# Patient Record
Sex: MALE | Race: WHITE
[De-identification: names, ages, dates, MRNs, and addresses within clinical notes are randomized per-mention and may not be internally consistent; named-entity substitution may affect disease eponyms.]

---

## 2017-07-05 ENCOUNTER — HOSPITAL ENCOUNTER (EMERGENCY)
Dept: HOSPITAL 45 - C.EDB | Age: 47
Discharge: HOME | End: 2017-07-05
Payer: COMMERCIAL

## 2017-07-05 VITALS
WEIGHT: 207.23 LBS | HEIGHT: 70 IN | BODY MASS INDEX: 29.67 KG/M2 | WEIGHT: 207.23 LBS | BODY MASS INDEX: 29.67 KG/M2 | HEIGHT: 70 IN

## 2017-07-05 VITALS — SYSTOLIC BLOOD PRESSURE: 113 MMHG | DIASTOLIC BLOOD PRESSURE: 65 MMHG | OXYGEN SATURATION: 95 % | HEART RATE: 72 BPM

## 2017-07-05 VITALS — TEMPERATURE: 98.24 F

## 2017-07-05 VITALS — OXYGEN SATURATION: 95 %

## 2017-07-05 DIAGNOSIS — Z79.899: ICD-10-CM

## 2017-07-05 DIAGNOSIS — Z79.82: ICD-10-CM

## 2017-07-05 DIAGNOSIS — Z82.49: ICD-10-CM

## 2017-07-05 DIAGNOSIS — I25.2: ICD-10-CM

## 2017-07-05 DIAGNOSIS — E11.9: ICD-10-CM

## 2017-07-05 DIAGNOSIS — K21.9: Primary | ICD-10-CM

## 2017-07-05 DIAGNOSIS — I10: ICD-10-CM

## 2017-07-05 LAB
ACANTHOCYTES BLD QL SMEAR: (no result)
ALP SERPL-CCNC: 73 U/L (ref 45–117)
ALT SERPL-CCNC: 57 U/L (ref 12–78)
ANION GAP SERPL CALC-SCNC: 10 MMOL/L (ref 3–11)
AST SERPL-CCNC: 36 U/L (ref 15–37)
BASOPHILS # BLD: 0.56 K/UL (ref 0–0.2)
BASOPHILS NFR BLD: 3.5 % (ref 0–2)
BUN SERPL-MCNC: 18 MG/DL (ref 7–18)
BUN/CREAT SERPL: 13.5 (ref 10–20)
CALCIUM SERPL-MCNC: 9.9 MG/DL (ref 8.5–10.1)
CHLORIDE SERPL-SCNC: 106 MMOL/L (ref 98–107)
CKMB/CK RATIO: 1.1 (ref 0–3)
CO2 SERPL-SCNC: 23 MMOL/L (ref 21–32)
COMPLETE: YES
CREAT CL PREDICTED SERPL C-G-VRATE: 79.1 ML/MIN
CREAT SERPL-MCNC: 1.3 MG/DL (ref 0.6–1.4)
EOSINOPHIL NFR BLD AUTO: 555 K/UL (ref 130–400)
GLUCOSE SERPL-MCNC: 173 MG/DL (ref 70–99)
HCT VFR BLD CALC: 46.3 % (ref 42–52)
HOWELL-JOLLY BOD BLD QL SMEAR: (no result)
LARGE GRANULAR LYMPH ABSOLUTE: 3.63 K/UL
LARGE GRANULAR LYMPHOCYTE %: 22.8 %
LYMPHOCYTES # BLD: 2.93 K/UL (ref 1.2–3.4)
LYMPHOCYTES NFR BLD: 18.4 %
MCH RBC QN AUTO: 30.4 PG (ref 25–34)
MCHC RBC AUTO-ENTMCNC: 33.5 G/DL (ref 32–36)
MCV RBC AUTO: 90.8 FL (ref 80–100)
NEUTROPHILS NFR BLD AUTO: 51.8 %
PMV BLD AUTO: 10.1 FL (ref 7.4–10.4)
POTASSIUM SERPL-SCNC: 4.1 MMOL/L (ref 3.5–5.1)
RBC # BLD AUTO: 5.1 M/UL (ref 4.7–6.1)
SODIUM SERPL-SCNC: 139 MMOL/L (ref 136–145)
WBC # BLD AUTO: 15.9 K/UL (ref 4.8–10.8)

## 2017-07-05 NOTE — EMERGENCY ROOM VISIT NOTE
History


Report prepared by David:  Jabier Angel


Under the Supervision of:  Dr. Freya Figueroa M.D.


First contact with patient:  09:23


Chief Complaint:  ILLNESS


Stated Complaint:  CHEST PAIN LAST EVENING, SOB





History of Present Illness


The patient is a 47 year old male who presents to the Emergency Room with 

complaints of intermittent "burning" centralized chest pain beginning last 

night. He also complains of a cough, chills, and SOB. He states that his 

coughing worsened his chest pain significantly. The patient notes that he is 

currently on Clindamycin for strep throat. He states that he feels symptoms of 

GERD every time he takes his Clindamycin. He is on Prilosec for his GERD. The 

patient had a stress test last year, but is scheduled for another stress test 

in a few months. He denies any increased pain with exertion. He denies any pain 

radiation, or leg swelling. The patient has a history of a previous MI 

occurring over 10 years ago. He took aspirin and nitroglycerin for his symptoms

, but is unsure if it has helped. He has no history of blood clots.





   Source of History:  patient


   Onset:  Yesterday


   Position:  chest (centralized)


   Quality:  burning


   Timing:  intermittent


   Associated Symptoms:  + chills, + cough, + SOB


Note:


The patient denies any pain radiation or leg swelling.





Review of Systems


See HPI for pertinent positives & negatives. A total of 10 systems reviewed and 

were otherwise negative.





Past Medical & Surgical


Medical Problems:


(1) Diabetes


(2) GERD (gastroesophageal reflux disease)


(3) Heart disease


(4) HTN (hypertension)


(5) Myocardial infarct








Family History





Hypertension





Social History


Smoking Status:  Never Smoker


Alcohol Use:  none


Drug Use:  none


Marital Status:  


Housing Status:  lives with family


Occupation Status:  employed





Current/Historical Medications


Scheduled


Aspirin (Aspir-Low), 81 MG PO QAM


Cefdinir (Omnicef), 300 MG PO Q12H


Clindamycin Hcl (Cleocin), 1 CAP PO TID


Fenofibrate (Tricor), 145 MG PO QAM


Levothyroxine Sodium (Synthroid), 125 MCG PO QAM


Loratadine (Claritin), 10 MG PO DAILY


Metformin HCl (Metformin HCl), 1 TAB PO BID


Metoprolol Tartrate (Lopressor) (Lopressor), 0.05 TAB PO BID


Omeprazole (Prilosec), 20 MG PO QAM


Omeprazole (Prilosec), 20 MG PO BID


Rosuvastatin Calcium (Crestor), 40 MG PO QPM





Scheduled PRN


Ranitidine Hcl (Zantac), 150 MG PO BID PRN for GERD





Allergies


Coded Allergies:  


     Amoxicillin (Verified  Allergy, Unknown, RASH, 7/5/17)





Physical Exam


Vital Signs











  Date Time  Temp Pulse Resp B/P (MAP) Pulse Ox O2 Delivery O2 Flow Rate FiO2


 


7/5/17 12:30  72 18 113/65 95   


 


7/5/17 11:30  69 18 122/67 96 Room Air  


 


7/5/17 09:37  76 16 120/72 94 Room Air  


 


7/5/17 09:36     95 Room Air  


 


7/5/17 09:29  67      


 


7/5/17 09:12 36.8 73 18 105/62 96 Room Air  











Physical Exam


Vital signs reviewed.





General: Well-appearing male, in no significant distress.





HEENT: No scleral icterus, PERRLA, neck supple.  Atraumatic.





Cardiovascular: Regular rate and rhythm, no extra sounds.





Pulmonary: Clear to auscultation bilaterally, normal work of breathing.





Abdomen: Soft, nontender, nondistended, positive bowel sounds.





Musculoskeletal: Atraumatic, no peripheral edema.





Neurologic: Patient awake alert and oriented x 3, full strength in all 4 

extremities.  Cranial nerves 2 through 12 grossly intact.





Skin: Warm, dry, no rash





Medical Decision & Procedures


ER Provider


Diagnostic Interpretation:


X-ray results as stated below per interpretation by me and the radiologist: 





CHEST ONE VIEW PORTABLE





FINDINGS: The cardiac and mediastinal contours are normal. There is no evidence


of focal pulmonary consolidation. There is no evidence of failure. No pleural


effusions are visualized.[ 





IMPRESSION: No active disease in the chest.





Electronically signed by:  Flaquito Eastman M.D.





Laboratory Results


7/5/17 09:34








Red Blood Count 5.10, Mean Corpuscular Volume 90.8, Mean Corpuscular Hemoglobin 

30.4, Mean Corpuscular Hemoglobin Concent 33.5, Mean Platelet Volume 10.1





7/5/17 09:34

















Test


  7/5/17


09:34 7/5/17


11:40


 


White Blood Count


  15.90 K/uL


(4.8-10.8) 


 


 


Red Blood Count


  5.10 M/uL


(4.7-6.1) 


 


 


Hemoglobin


  15.5 g/dL


(14.0-18.0) 


 


 


Hematocrit 46.3 % (42-52)  


 


Mean Corpuscular Volume


  90.8 fL


() 


 


 


Mean Corpuscular Hemoglobin


  30.4 pg


(25-34) 


 


 


Mean Corpuscular Hemoglobin


Concent 33.5 g/dl


(32-36) 


 


 


Platelet Count


  555 K/uL


(130-400) 


 


 


Mean Platelet Volume


  10.1 fL


(7.4-10.4) 


 


 


RDW Standard Deviation


  46.4 fL


(36.4-46.3) 


 


 


RDW Coefficient of Variation


  13.9 %


(11.5-14.5) 


 


 


Neutrophils % (Manual) 51.8 %  


 


Lymphocytes % (Manual) 18.4 %  


 


Monocytes % (Manual) 3.5 %  


 


Basophils % (Manual) 3.5 % (0-2)  


 


Neutrophils # (Manual)


  8.24 K/uL


(1.4-6.5) 


 


 


Total Absolute Neutrophils


  8.24 K/uL


(1.4-6.5) 


 


 


Lymphocytes # (Manual)


  2.93 K/uL


(1.2-3.4) 


 


 


Total Absolute Lymphocytes


  6.55 K/uL


(1.2-3.4) 


 


 


Monocytes # (Manual)


  0.56 K/uL


(0.11-0.59) 


 


 


Basophils # (Manual)


  0.56 K/uL


(0-0.2) 


 


 


Percent Large Granular


Lymphocytes 22.8 % 


  


 


 


Absolute Large Granular


Lymphocytes 3.63 K/uL 


  


 


 


Balderas-Jolly Bodies 1+  


 


Acanthocytes 1+  


 


Anion Gap


  10.0 mmol/L


(3-11) 


 


 


Est Creatinine Clear Calc


Drug Dose 79.1 ml/min 


  


 


 


Estimated GFR (


American) 74.3 


  


 


 


Estimated GFR (Non-


American 64.1 


  


 


 


BUN/Creatinine Ratio 13.5 (10-20)  


 


Calcium Level


  9.9 mg/dl


(8.5-10.1) 


 


 


Total Bilirubin


  0.2 mg/dl


(0.2-1) 


 


 


Direct Bilirubin


  < 0.1 mg/dl


(0-0.2) 


 


 


Aspartate Amino Transf


(AST/SGOT) 36 U/L (15-37) 


  


 


 


Alanine Aminotransferase


(ALT/SGPT) 57 U/L (12-78) 


  


 


 


Alkaline Phosphatase


  73 U/L


() 


 


 


Total Creatine Kinase


  98 U/L


() 


 


 


Creatine Kinase MB


  1.1 ng/ml


(0.5-3.6) 


 


 


Creatine Kinase MB Ratio 1.1 (0-3.0)  


 


Total Protein


  7.6 gm/dl


(6.4-8.2) 


 


 


Albumin


  3.7 gm/dl


(3.4-5.0) 


 


 


Bedside Troponin I


  


  < 0.030 ng/ml


(0-0.045)





Laboratory results per my review.





Medications Administered











 Medications


  (Trade)  Dose


 Ordered  Sig/Tita


 Route  Start Time


 Stop Time Status Last Admin


Dose Admin


 


 Lidocaine HCl


  (Viscous


 Lidocaine 2% Soln)  10 ml  NOW  STAT


 PO  7/5/17 09:39


 7/5/17 09:42 DC 7/5/17 09:48


10 ML


 


 Al Hydroxide/Mg


 Hydroxide


  (Maalox Susp)  30 ml  NOW  STAT


 PO  7/5/17 09:39


 7/5/17 09:42 DC 7/5/17 09:48


30 ML











ECG


Indication:  chest pain


Rate (beats per minute):  67


Rhythm:  normal sinus


Findings:  no acute ischemic change, no ectopy, other (J-point elevation in the 

anterior leads. )


Comparison ECG Date:  December 22, 2014


Change:  no significant change





ED Course


0930: Past medical records reviewed. The patient was evaluated in room A9B. A 

complete history and physical examination was performed. 





0939: Ordered Maalox Susp 30 mL O, Lidocaine HCl 10 mL PO. 





1132: I reassessed the patient. He is feeling better.





1220: Upon reevaluation, the patient appeared to have improvement of his 

symptoms. I discussed findings with him. The patient verbalized agreement of 

the treatment plan. He was discharged home.





Medical Decision


Differential diagnosis:


Acute coronary syndrome, pulmonary embolus, aortic dissection, musculoskeletal 

pain, pneumonia, pleural effusion, pneumothorax, medication intolerance, GERD





This patient was evaluated and appeared to be in no significant distress.  IV 

access was obtained and laboratory work was drawn.  Patient was placed on the 

cardiac monitor and found to be in a normal sinus rhythm.  EKG reveals no 

evidence of acute ischemic change.  There is no change from EKG dated December 2014.  Cardiac enzymes are negative 2.  Patient had some improvement with a GI 

cocktail.  I suspect his symptoms are GERD/gastritis related to the clindamycin 

he is taking.  The patient has a rash reaction to amoxicillin.  He was switched 

to Omnicef 300 mg twice daily for 10 days.  He will stop the clindamycin.  He 

will increase the Prilosec to 20 mg twice a day.  He continues Zantac as needed 

when necessary.  Patient will follow-up with his primary care physician this 

week and return to the ER for worsening of symptoms or any medical concerns.





Impression





 Primary Impression:  


 GERD (gastroesophageal reflux disease)


 Additional Impression:  


 Medication intolerance





Scribe Attestation


The scribe's documentation has been prepared under my direction and personally 

reviewed by me in its entirety. I confirm that the note above accurately 

reflects all work, treatment, procedures, and medical decision making performed 

by me.





Departure Information


Dispostion


Home / Self-Care





Prescriptions





Cefdinir (Omnicef) 300 Mg Cap


300 MG PO Q12H for 10 Days, #20 CAP


   Prov: Freya Figueroa M.D.         7/5/17 


Ranitidine Hcl (ZANTAC) 150 Mg Tab


150 MG PO BID Y for GERD, #30 TAB


   Prov: Freya Figueroa M.D.         7/5/17 


Omeprazole (PRILOSEC) 20 Mg Capcr


20 MG PO BID for 7 Days, #14 CAP


   Prov: Freya Figueroa M.D.         7/5/17





Referrals


Juan Jose Perea M.D. (PCP)





Forms


HOME CARE DOCUMENTATION FORM,                                                 

               IMPORTANT VISIT INFORMATION, WORK / SCHOOL INSTRUCTIONS





Patient Instructions


My [facility]





Additional Instructions





Diagnosis: GERD, medication intolerance





Increase prilosec to 20 mg TWICE daily.





Zantac 150 mg every 12 hours as needed for gastritis.





Drink plenty of fluids.





Avoid coffee, alcohol, aspirin, aleve and ibuprofen 





Return to the ED for worsening of symptoms or any medical concerns.





Problem Qualifiers

## 2017-07-05 NOTE — DIAGNOSTIC IMAGING REPORT
CHEST ONE VIEW PORTABLE



CLINICAL HISTORY: Atypical chest pain    



COMPARISON STUDY:  12/22/2014



FINDINGS: The cardiac and mediastinal contours are normal. There is no evidence

of focal pulmonary consolidation. There is no evidence of failure. No pleural

effusions are visualized.[ 



IMPRESSION: No active disease in the chest.







Electronically signed by:  Flaquito Eastman M.D.

7/5/2017 10:22 AM



Dictated Date/Time:  7/5/2017 10:21 AM

## 2017-11-21 ENCOUNTER — HOSPITAL ENCOUNTER (OUTPATIENT)
Dept: HOSPITAL 45 - C.CTS | Age: 47
Discharge: HOME | End: 2017-11-21
Attending: INTERNAL MEDICINE
Payer: COMMERCIAL

## 2017-11-21 DIAGNOSIS — K76.0: ICD-10-CM

## 2017-11-21 DIAGNOSIS — R91.1: ICD-10-CM

## 2017-11-21 DIAGNOSIS — Q23.1: Primary | ICD-10-CM

## 2017-11-21 NOTE — DIAGNOSTIC IMAGING REPORT
CT CHEST ANGIO WITH CONTRAST



CT DOSE: 403.38 mGy.cm



CLINICAL HISTORY: Bicuspid aortic valve.    



TECHNIQUE: The patient was scanned in a dynamic helical fashion during

intravenous administration of 119 cc Optiray 320. MIP imaging was performed.  A

dose lowering technique was utilized adhering to the principles of ALARA.





COMPARISON STUDY:  12/4/2013



FINDINGS: No thyroid masses are visualized.



There is no evidence of pathologic mediastinal lymphadenopathy.



There is no evidence of pathologic hilar lymphadenopathy.



There is no evidence of pathologic axillary lymphadenopathy.



There are no pulmonary artery filling defects to indicate acute pulmonary

embolism.



There is no evidence of thoracic aortic aneurysm. The ascending thoracic aorta

measures 29 mm. There is no evidence of thoracic aortic dissection. There is no

evidence of coarctation. Although this study was not performed in a gated

fashion, the images of the aortic root suggest a tricuspid valve.



No pleural effusions are visualized. There is no evidence of focal pulmonary

consolidation. There is minor biapical pleural-parenchymal scarring. There is a

3.4 mm solid right upper lobe pulmonary nodule. This remains unchanged the prior

December 2013 study.



There is hepatic steatosis.



IMPRESSION:  

1. No evidence of thoracic aortic aneurysm, dissection, or a large dictation

2. Stable 3 mm right upper lobe pulmonary nodule

3. No evidence of pathologic adenopathy

4. Hepatic steatosis 







Electronically signed by:  Flaquito Eastman M.D.

11/21/2017 9:34 AM



Dictated Date/Time:  11/21/2017 9:24 AM Yes

## 2018-02-06 ENCOUNTER — HOSPITAL ENCOUNTER (EMERGENCY)
Dept: HOSPITAL 45 - C.EDB | Age: 48
Discharge: STILL ADMITTED OR ADMINISTRATIVE | End: 2018-02-06
Payer: COMMERCIAL

## 2018-02-06 VITALS
WEIGHT: 203.71 LBS | BODY MASS INDEX: 29.16 KG/M2 | HEIGHT: 70 IN | WEIGHT: 203.71 LBS | HEIGHT: 70 IN | BODY MASS INDEX: 29.16 KG/M2

## 2018-02-06 VITALS — OXYGEN SATURATION: 98 %

## 2018-02-06 VITALS — HEART RATE: 84 BPM | SYSTOLIC BLOOD PRESSURE: 135 MMHG | OXYGEN SATURATION: 99 % | DIASTOLIC BLOOD PRESSURE: 79 MMHG

## 2018-02-06 VITALS — TEMPERATURE: 98.06 F

## 2018-02-06 DIAGNOSIS — E11.9: ICD-10-CM

## 2018-02-06 DIAGNOSIS — I25.2: ICD-10-CM

## 2018-02-06 DIAGNOSIS — I73.9: ICD-10-CM

## 2018-02-06 DIAGNOSIS — Z82.49: ICD-10-CM

## 2018-02-06 DIAGNOSIS — Z79.82: ICD-10-CM

## 2018-02-06 DIAGNOSIS — Z79.899: ICD-10-CM

## 2018-02-06 DIAGNOSIS — I25.10: ICD-10-CM

## 2018-02-06 DIAGNOSIS — I21.3: Primary | ICD-10-CM

## 2018-02-06 DIAGNOSIS — I44.7: ICD-10-CM

## 2018-02-06 DIAGNOSIS — K21.9: ICD-10-CM

## 2018-02-06 LAB
ALBUMIN SERPL-MCNC: 4.4 GM/DL (ref 3.4–5)
ALP SERPL-CCNC: 69 U/L (ref 45–117)
ALT SERPL-CCNC: 73 U/L (ref 12–78)
AST SERPL-CCNC: 38 U/L (ref 15–37)
BUN SERPL-MCNC: 24 MG/DL (ref 7–18)
BUN SERPL-MCNC: 24 MG/DL (ref 7–18)
CA-I BLD-SCNC: 1.36 MMOL/L (ref 1.12–1.32)
CALCIUM SERPL-MCNC: 7.3 MG/DL (ref 8.5–10.1)
CALCIUM SERPL-MCNC: 9.7 MG/DL (ref 8.5–10.1)
CK MB SERPL-MCNC: 1.3 NG/ML (ref 0.5–3.6)
CK MB SERPL-MCNC: 1.8 NG/ML (ref 0.5–3.6)
CO2 SERPL-SCNC: 18 MMOL/L (ref 21–32)
CO2 SERPL-SCNC: 25 MMOL/L (ref 21–32)
CREAT BLD-MCNC: 1.2 MG/DL (ref 0.6–1.3)
CREAT SERPL-MCNC: 1.32 MG/DL (ref 0.6–1.4)
CREAT SERPL-MCNC: 1.4 MG/DL (ref 0.6–1.4)
EOSINOPHIL NFR BLD AUTO: 313 K/UL (ref 130–400)
EOSINOPHIL NFR BLD AUTO: 561 K/UL (ref 130–400)
GLUCOSE SERPL-MCNC: 164 MG/DL (ref 70–99)
GLUCOSE SERPL-MCNC: 286 MG/DL (ref 70–99)
HCT VFR BLD CALC: 43.7 % (ref 42–52)
HCT VFR BLD CALC: 45.6 % (ref 42–52)
HGB BLD-MCNC: 14.1 G/DL (ref 14–18)
HGB BLD-MCNC: 15.2 G/DL (ref 14–18)
ISTAT POTASSIUM: 4 MEQ/L (ref 3.3–5)
MCH RBC QN AUTO: 30.4 PG (ref 25–34)
MCH RBC QN AUTO: 30.7 PG (ref 25–34)
MCHC RBC AUTO-ENTMCNC: 32.3 G/DL (ref 32–36)
MCHC RBC AUTO-ENTMCNC: 33.3 G/DL (ref 32–36)
MCV RBC AUTO: 92.1 FL (ref 80–100)
MCV RBC AUTO: 94.2 FL (ref 80–100)
PMV BLD AUTO: 10.3 FL (ref 7.4–10.4)
PMV BLD AUTO: 11 FL (ref 7.4–10.4)
POTASSIUM SERPL-SCNC: 3.6 MMOL/L (ref 3.5–5.1)
POTASSIUM SERPL-SCNC: 4 MMOL/L (ref 3.5–5.1)
PROT SERPL-MCNC: 8.1 GM/DL (ref 6.4–8.2)
PTT PATIENT: 23.3 SECONDS (ref 21–31)
RED CELL DISTRIBUTION WIDTH CV: 14.4 % (ref 11.5–14.5)
RED CELL DISTRIBUTION WIDTH CV: 14.6 % (ref 11.5–14.5)
RED CELL DISTRIBUTION WIDTH SD: 47.8 FL (ref 36.4–46.3)
RED CELL DISTRIBUTION WIDTH SD: 49.4 FL (ref 36.4–46.3)
SODIUM BLD-SCNC: 142 MEQ/L (ref 135–144)
SODIUM SERPL-SCNC: 138 MMOL/L (ref 136–145)
SODIUM SERPL-SCNC: 139 MMOL/L (ref 136–145)
WBC # BLD AUTO: 20.98 K/UL (ref 4.8–10.8)
WBC # BLD AUTO: 24.46 K/UL (ref 4.8–10.8)

## 2018-02-06 NOTE — EMERGENCY ROOM VISIT NOTE
ED Visit Note


First contact with patient:  18:02


Patient was seen by our PA/NP.  I was involved in the patient's care and did 

evaluate the patient myself.  I was involved in the care throughout the ER stay.





The patient presents with substernal chest pain.  He had pain a few days ago 

while doing some snow work that resolved with rest.  Today, he had pain while 

exercising.  The severe pain subsided after exercise but he's had some burning 

across his chest for the last several hours.





The patient's workup does show a new left bundle-branch block with some ST 

elevation across the anterior leads.  The bundle-branch block is new as of the 

last half a year.  The patient is still having some mild burning in his chest.  

He does have an impressive systolic murmur on exam-the murmur has been 

documented in the past.





Because of the change in the EKG and his ongoing chest pain.  I did contact the 

cardiologist on for heart alert.  Based on the story, a heart alert was called.

## 2018-02-06 NOTE — EMERGENCY ROOM VISIT NOTE
History


First contact with patient:  18:02


Chief Complaint:  CHEST PAIN


Stated Complaint:  CHEST PAIN, PRESSURE ON HEART- CARDIAC HX


Nursing Triage Summary:  


onset of CP while on the eliptical today around noon. pt reports it was mid 


chest, burning in nature. Associated SOB and dizziness at that time. Pt also 


reports episode of the same on Sunday while using the . Pt reports 


burning, heartburn currently





History of Present Illness


The patient is a 47 year old male who presents to the Emergency Room via 

private vehicle with complaints of "chest pain, pressure and heart-cardiac 

history".  The patient states that he has a history of MI with intervention in 

2004.  He had an echo one month ago which he states was normal, but notes that 

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 

developed chest pain.  It quickly resolved after rest.  He states that earlier 

today around 3 or 3:30 PM he was on the treadmill exercising, and developed 

chest pain that extended from his throat to his umbilicus.  He notes that when 

he exited the treadmill the pain went away.  He now notes a burning sensation 

in his throat and superior chest.  He notes it feels like a burning sensation 

somewhat that of reflux but states that when he had his previous heart attack 

it felt identical.  He had a baby aspirin earlier today.





Review of Systems


A complete 10-point Review of Systems was discussed with the patient, with 

pertinent positives and negatives listed in the History of Present Illness. All 

remaining Review of Systems questions can be considered negative unless 

otherwise specified.





Past Medical/Surgical History


Medical Problems:


(1) Diabetes


(2) GERD (gastroesophageal reflux disease)


(3) Heart disease


(4) HTN (hypertension)


(5) Myocardial infarct








Family History





Hypertension





Social History


Smoking Status:  Never Smoker


Alcohol Use:  none


Drug Use:  none


Marital Status:  


Housing Status:  lives with family


Occupation Status:  employed





Current/Historical Medications


Scheduled


Aspirin (Aspir-Low), 81 MG PO QAM


Fenofibrate (Tricor), 145 MG PO QAM


Levothyroxine Sodium (Synthroid), 125 MCG PO QAM


Loratadine (Claritin), 10 MG PO DAILY


Metformin HCl (Metformin HCl), 500 MG PO BID


Metoprolol Tartrate (Lopressor) (Lopressor), 1 DOSE PO BID


Omeprazole (Prilosec), 20 MG PO QAM


Rosuvastatin Calcium (Crestor), 40 MG PO QPM





Physical Exam


Vital Signs











  Date Time  Temp Pulse Resp B/P (MAP) Pulse Ox O2 Delivery O2 Flow Rate FiO2


 


2/6/18 18:42  84 15 135/79 99 Nasal Cannula 2.0 


 


2/6/18 18:33  84 16 135/79 99 Nasal Cannula 2.0 


 


2/6/18 18:19     98 Room Air  


 


2/6/18 18:19     98 Room Air  


 


2/6/18 18:12  80      


 


2/6/18 17:51 36.7 72 18 115/69 96 Room Air  











Physical Exam


VITAL SIGNS - Vital signs and nursing notes were reviewed.  Stable.


GENERAL - 47-year-old male appearing his stated age who is in no acute 

distress. He is flushed in appearance in relation to his skin hue. Communicates 

well with provider and answers questions appropriately.


SKIN - Without rashes.


HEAD - NC/AT.


EYES - Sclera anicteric. 


EARS - No deformities of external structures noted on gross examination 

bilaterally. 


NOSE - Midline and without cyanosis. No epistaxis or purulent drainage noted.


MOUTH/OROPHARYNX - Without perioral cyanosis. 


NECK - Neck with FROM. Supple to palpation. 


LUNGS - Chest wall symmetric without accessory muscle use, intercostals 

retractions, or central cyanosis. Normal vesicular breath sounds CTA B/L. No 

wheezes, rales, or rhonchi appreciated.


CARDIAC - RRR with S1/S2. Loud murmur appreciated.


ABDOMEN - Abdominal contour normal without pulsations or visible masses. 


EXTREMITIES - No clubbing or peripheral cyanosis.





Medical Decision & Procedures


ER Provider


Diagnostic Interpretation:


CHEST ONE VIEW PORTABLE





HISTORY:  47 years-old Male chest pain acute atypical chest pain





COMPARISON: Chest radiograph 7/5/2017, CTA chest 11/21/2017





TECHNIQUE: Portable AP view of the chest





FINDINGS: 


Cardiomediastinal and hilar silhouettes are within normal limits.


Atherosclerosis of the aorta. No pneumothorax, pleural effusion, focal airspace


consolidation or overt pulmonary edema. Bones of the chest appear grossly


intact. Curvilinear calcification overlying the right greater tuberosity may


reflect calcific bursitis, however is nonspecific. Surgical clips project over


the epigastric region.





IMPRESSION: No acute process. 











The above report was generated using voice recognition software. It may contain


grammatical, syntax or spelling errors.











Electronically signed by:  Fede Phelps M.D.


2/6/2018 6:40 PM





Dictated Date/Time:  2/6/2018 6:37 PM





Laboratory Results


2/6/18 21:00








Red Blood Count 4.64, Mean Corpuscular Volume 94.2, Mean Corpuscular Hemoglobin 

30.4, Mean Corpuscular Hemoglobin Concent 32.3, Mean Platelet Volume 11.0





2/6/18 21:00

















Test


  2/6/18


18:10 2/6/18


18:16 2/6/18


18:21 2/6/18


21:00


 


Activated Partial


Thromboplast Time 23.3 SECONDS


(21.0-31.0) 


  


  


 


 


Partial Thromboplastin Ratio 0.9    


 


Total Bilirubin


  0.3 mg/dl


(0.2-1) 


  


  


 


 


Aspartate Amino Transf


(AST/SGOT) 38 U/L (15-37) 


  


  


  


 


 


Alanine Aminotransferase


(ALT/SGPT) 73 U/L (12-78) 


  


  


  


 


 


Alkaline Phosphatase


  69 U/L


() 


  


  


 


 


Total Creatine Kinase


  155 U/L


() 


  


  


 


 


Total Protein


  8.1 gm/dl


(6.4-8.2) 


  


  


 


 


Albumin


  4.4 gm/dl


(3.4-5.0) 


  


  


 


 


Globulin


  3.7 gm/dl


(2.5-4.0) 


  


  


 


 


Albumin/Globulin Ratio 1.2 (0.9-2)    


 


Thyroid Stimulating Hormone


(TSH) 0.479 uIu/ml


(0.300-4.500) 


  


  


 


 


Bedside Troponin I


  


  < 0.030 ng/ml


(0-0.045) 


  


 


 


Bedside Hemoglobin


  


  


  15.6 g/dl


(14.0-18.0) 


 


 


Bedside Hematocrit   46 % (42-52)  


 


Bedside Sodium


  


  


  142 mEq/L


(135-144) 


 


 


Bedside Potassium


  


  


  4.0 mEq/L


(3.3-5.0) 


 


 


Bedside Chloride


  


  


  103 mEq/L


(101-112) 


 


 


Bedside Total CO2


  


  


  25 mEq/l


(24-31) 


 


 


Bedside Blood Urea Nitrogen


  


  


  27 mg/dl


(7-18) 


 


 


Bedside Creatinine


  


  


  1.2 mg/dl


(0.6-1.3) 


 


 


Bedside Glucose (other)


  


  


  170 mg/dl


(70-99) 


 


 


Bedside Ionized Calcium (John)


  


  


  1.36 mmol/l


(1.12-1.32) 


 


 


White Blood Count


  


  


  


  24.46 K/uL


(4.8-10.8)


 


Red Blood Count


  


  


  


  4.64 M/uL


(4.7-6.1)


 


Hemoglobin


  


  


  


  14.1 g/dL


(14.0-18.0)


 


Hematocrit    43.7 % (42-52) 


 


Mean Corpuscular Volume


  


  


  


  94.2 fL


()


 


Mean Corpuscular Hemoglobin


  


  


  


  30.4 pg


(25-34)


 


Mean Corpuscular Hemoglobin


Concent 


  


  


  32.3 g/dl


(32-36)


 


Platelet Count


  


  


  


  313 K/uL


(130-400)


 


Mean Platelet Volume


  


  


  


  11.0 fL


(7.4-10.4)


 


RDW Standard Deviation


  


  


  


  49.4 fL


(36.4-46.3)


 


RDW Coefficient of Variation


  


  


  


  14.6 %


(11.5-14.5)


 


Neutrophils % (Manual)    40.0 % 


 


Lymphocytes % (Manual)    28.7 % 


 


Variant Lymphocytes % (manual)    25.2 % 


 


Monocytes % (Manual)    5.2 % 


 


Basophils % (Manual)    0.9 % (0-2) 


 


Neutrophils # (Manual)


  


  


  


  9.78 K/uL


(1.4-6.5)


 


Total Absolute Neutrophils


  


  


  


  9.78 K/uL


(1.4-6.5)


 


Lymphocytes # (Manual)


  


  


  


  7.02 K/uL


(1.2-3.4)


 


Absolute Variant Lymphocytes    6.16 K/uL 


 


Total Absolute Lymphocytes


  


  


  


  13.18 K/uL


(1.2-3.4)


 


Monocytes # (Manual)


  


  


  


  1.27 K/uL


(0.11-0.59)


 


Basophils # (Manual)


  


  


  


  0.22 K/uL


(0-0.2)


 


Anion Gap


  


  


  


  13.0 mmol/L


(3-11)


 


Est Creatinine Clear Calc


Drug Dose 


  


  


  72.9 ml/min 


 


 


Estimated GFR (


American) 


  


  


  67.9 


 


 


Estimated GFR (Non-


American 


  


  


  58.6 


 


 


BUN/Creatinine Ratio    16.9 (10-20) 


 


Calcium Level


  


  


  


  7.3 mg/dl


(8.5-10.1)


 


Creatine Kinase MB


  


  


  


  1.3 ng/ml


(0.5-3.6)


 


Chemistry Specimen Hemolysis     


 


Test


  2/6/18


21:49 2/6/18


22:10 


  


 


 


Creatine Kinase MB Ratio  (0-3.0)    


 


Lactic Acid Level


  


  12.2 mmol/L


(0.4-2.0) 


  


 











Medications Administered











 Medications


  (Trade)  Dose


 Ordered  Sig/Tita


 Route  Start Time


 Stop Time Status Last Admin


Dose Admin


 


 Aspirin


  (Aspirin Chew)  243 mg  NOW  STAT


 PO  2/6/18 18:12


 2/6/18 18:14 DC 2/6/18 18:23


243 MG


 


 Nitroglycerin


  (Nitroglycerin


 2% Oint)  1 inch  NOW  STAT


 EXT  2/6/18 18:15


 2/6/18 18:16 DC 2/6/18 18:23


1 INCH


 


 Heparin Sodium


  (Porcine)


  (Heparin Sq 5000


 Unit/0.5ml)  5,000 unit  STK-MED ONCE


 .ROUTE  2/6/18 18:21


 2/6/18 18:22 DC 2/6/18 18:27


5,000 UNIT


 


 Midazolam HCl


  (Versed Inj)  2 mg  STK-MED ONCE


 .ROUTE  2/6/18 18:47


 2/6/18 18:48 DC 2/6/18 18:47


2 MG


 


 Fentanyl Citrate


  (Fentanyl Inj)  100 mcg  STK-MED ONCE


 .ROUTE  2/6/18 18:47


 2/6/18 18:48 DC 2/6/18 18:47


50 MCG


 


 Midazolam HCl


  (Versed Inj)  2 mg  STK-MED ONCE


 .ROUTE  2/6/18 19:36


 2/6/18 19:37 DC 2/6/18 19:36


2 MG


 


 Fentanyl Citrate


  (Fentanyl Inj)  100 mcg  STK-MED ONCE


 .ROUTE  2/6/18 19:36


 2/6/18 19:37 DC 2/6/18 19:36


100 MCG


 


 Midazolam HCl


  (Versed Inj)  2 mg  STK-MED ONCE


 .ROUTE  2/6/18 19:56


 2/6/18 19:57 DC 2/6/18 19:56


2 MG


 


 Fentanyl Citrate


  (Fentanyl Inj)  100 mcg  STK-MED ONCE


 .ROUTE  2/6/18 19:56


 2/6/18 19:57 DC 2/6/18 19:56


100 MCG


 


 Furosemide


  (Lasix Inj)  40 mg  STK-MED ONCE


 .ROUTE  2/6/18 20:15


 2/6/18 20:16 DC 2/6/18 20:15


40 MG


 


 Ondansetron HCl


  (Zofran Inj)  4 mg  STK-MED ONCE


 .ROUTE  2/6/18 20:20


 2/6/18 20:21 DC 2/6/18 20:20


4 MG


 


 Dopamine HCl/


 Dextrose


  (DOPamine 400MG /


 D5W)  400 mg  STK-MED ONCE


 .ROUTE  2/6/18 20:21


 2/6/18 20:22 DC 2/6/18 20:21


400 MG


 


 Norepinephrine


 Bitartrate


  (Levophed Inj)  8 mg  STK-MED ONCE


 IV  2/6/18 20:25


 2/6/18 20:26 DC 2/6/18 20:25


8 MG


 


 Phenylephrine HCl


  (Sami-Synephrine


 Inj)  10 mg  STK-MED ONCE


 .ROUTE  2/6/18 20:36


 2/6/18 20:37 DC 2/6/18 20:36


10 MG


 


 Phenylephrine HCl


  (Sami-Synephrine


 Inj)  10 mg  STK-MED ONCE


 .ROUTE  2/6/18 20:40


 2/6/18 20:41 DC 2/6/18 20:40


10 MG


 


 Epinephrine HCl


  (EpINEphrine INJ


 1MG/ML AMP/VIAL)  4 mg  STK-MED ONCE


 .ROUTE  2/6/18 21:18


 2/6/18 21:19 DC 2/6/18 21:18


4 MG


 


 Sodium Bicarbonate


  (Sodium


 Bicarbonate 8.4%


 Inj)  100 ml  STK-MED ONCE


 IV  2/6/18 21:26


 2/6/18 21:27 DC 2/6/18 21:26


100 ML


 


 Midazolam HCl


  (Versed Inj)  2 mg  STK-MED ONCE


 .ROUTE  2/6/18 21:29


 2/6/18 21:30 DC 2/6/18 21:29


2 MG


 


 Fentanyl Citrate


  (Fentanyl Inj)  100 mcg  STK-MED ONCE


 .ROUTE  2/6/18 21:29


 2/6/18 21:30 DC 2/6/18 21:29


100 MCG


 


 Amiodarone HCL/


 Dextrose


  (Nexterone / D5w)  360 mg  STK-MED ONCE


 .ROUTE  2/6/18 21:41


 2/6/18 21:42 DC 2/6/18 21:41


360 MG


 


 Amiodarone HCL/


 Dextrose


  (Nexterone / D5w)  150 mg  STK-MED ONCE


 .ROUTE  2/6/18 21:41


 2/6/18 21:42 DC 2/6/18 21:41


150 MG


 


 Dobutamine HCl


  (DOBUTamine /


 D5W)  500 mg  STK-MED ONCE


 .ROUTE  2/6/18 21:45


 2/6/18 21:46 DC 2/6/18 21:45


500 MG


 


 Norepinephrine


 Bitartrate


  (Levophed Inj)  8 mg  STK-MED ONCE


 IV  2/6/18 22:34


 2/6/18 22:35 DC 2/6/18 22:34


8 MG











Medical Decision


Patient was seen and evaluated as above.  Upon my entrance into the exam room 

the patient was flushed, and was noting he was feeling a burning sensation in 

his chest but no pain.  Bedside EKG was reviewed and per my interpretation 

reveals a left bundle branch block which is new compared to previous.  This is 

concerning in the setting of active chest pain.  I called the attending 

physician and we discussed the case without delay. He was given aspirin, 

nitroglycerin.  Chest x-ray was obtained.  Heart alert was called.  The 

attending physician, Dr. Camejo also personally evaluate the patient and spoke 

with the cardiac interventionalist. They recommended 5000 units of heparin.  

This was given IV.  The patient will be taken to the Cath Lab for further 

evaluation and management of his active chest pain in the setting of EKG 

changes.  Initial troponin was 0.02.  CBC reveals leukocytosis of 20.98. Coags 

normal. Metabolic panel reveals a dehydrated state with BUN elevated at 24 and 

cr at 1.32.  No evidence of liver failure.  Patient's AST elevated at 38.  TSH 

normal.  The patient will be taken to cath lab for intervention.  Please refer 

to further documentation regarding his stay.





In evaluation treatment this patient following differential diagnoses were 

entertained: MI, PE, dissection, pneumonia, pericarditis, costochondritis, 

among others.





Impression





 Primary Impression:  


 Chest pain


 Additional Impression:  


 New onset left bundle branch block (LBBB)





Departure Information


Referrals


Juan Jose Perea M.D. (PCP)





Forms


Call Back Authorization, HOME CARE DOCUMENTATION FORM,                         

                                       IMPORTANT VISIT INFORMATION





Patient Instructions


[facility]





Problem Qualifiers

## 2018-02-06 NOTE — CARDIAC CATHETERIZATION
Procedure Note


Procedure Date


Feb 6, 2018.





Pre-Procedure Diagnosis


STEMI





AUC Score


9





Post-Procedure Diagnosis


Severe CAD, Unsuccessful PCI





Procedure(s) Performed


Coronary Angiography, PTCA, Temporary Pacemaker, IABP, CPR, Defibrillation





Cardiologist


Wily





Assistant(s)


Ernestina





Estimated Blood Loss


20





Medication(s)


Dopamine, Epinephrine, Fentanyl, Heparin, Nicardipine, Nitroglycerin, 

Norepinephrine, Versed, Lidocaine 1%


Dobutamine


Phenylephedrine





Summary of Findings


Indication: 


47 year old man with known CAD s/p prior MI and PCI with stents x2 to RCA in 

2004, bicuspid aortic valve with moderate AS and AI, non-insulin dependent 

diabetes, PAD who presented with new intermittent exertional chest pain over 

the last several days.  Pain recurred today after with exercise and presented 

with persistent chest burning.  In found to have a new LBBB and Heart alert 

activated.  





Access: 6Fr slender right radial artery


   6Fr right femoral artery


   8Fr left femoral artery


   6Fr right femoral vein





Catheters: Tiger, JR4, 3DRC, Trap, RCB; JL3.5 guide; JR4 guide





Findings:


LM - 50% ostial stenosis, 20% distal stenosis


LAD - Suspected at least moderate ostial LAD stenosis; angiographically normal 

mid and distal vessel; gives off 2 moderate caliber diagonals without 

significant disease.


Circumflex - Moderate caliber vessel with minimal luminal irregularities.  

Gives off 3 OMs.  Distal OM is moderate caliber without significant disease.


RCA - Large, dominant vessel with ostial/proximal RCA stent with 95% ostial in-

stent restenosis; widely patent mid circumflex stent; gives off large PDA and R-

PLB with minimal disease.





Significant difficulty engaging ostial RCA requiring multiple attempted 

catheters.  Eventually able to demonstrate critical ostial in-stent restenosis 

of RCA with RCB catheter.


LM and ostial LAD disease was questionable on initial angiography and plan was 

to further evaluate with iFR.


With guide catheter was able to demonstrate severe ostial LM disease.  In the 

setting of ostial RCA disease, ostial LM disease and moderate AS plan was to 

finish case and transfer to tertiary center for evaluation for CABG, AVR.


Patient than began to develop severe chest pain, diaphoresis and hypoxia.  Left 

system was shown to be unchanged.  


Noted to have new ST elevations in inferior leads and RCA thought likely to be 

occluded.


Patient became increasingly hypotensive and started on dopamine and IVFs


RCA was cannulated with JR4 guide and BMW was eventually passed into the distal 

vessel


Rhythm change to VT requiring 1 defibrillation and amiodarone bolus.  


In the setting of hemodynamic/electrical instability, hypoxia and vomiting 

decision made to intubate patient


In preparation for intubation patient had PEA arrest requiring epi and brief 

CPR with ROSC


Code blue called and patient intubated by Dr. Gutierrez.


Attempted to POBA ostial ISR but with inflation to ostium ruptured multiple 2.5 

compliant balloons. 


Patient became increasingly hypotensive requiring escalating pressors including 

norepinephrine, phenylephedrine/epinephrine boluses.  Acidemia treated with 

multiple rounds of bicarb and eventual bicarb infusion. 


[facility] contacted for emergent transfer


IABP placed to left CFA for additional support with initial adequate 

augmentation. 


Able to re-establish intermittent flow down RCA after 2.5 angiosculpt and 

multiple inflations with 3.0 compliant balloon.  Unable to pass 3.0 

angioscuplt.  


Discussions with Ann regarding mobile ECMO placement here at [facility]


Developed heart block requiring placement of temporary venous pacemaker


Continued escalation of pressors including phenylephrine, dobutamine and 

epinephrine


Became increasingly difficult to oxygenate and despite 5 pressors unable to 

maintain MAPs.


Several episodes of VT/VF requiring defibrillation, and eventual PEA arrest. 


After more than 20 minutes of chest compressions, multiple rounds of epinephrine

/bicarb patient remained in asystole and resuscitation efforts stopped.


Time of death 22:37.





Hemodynamics


Rest Ao:


--


Final Ao:


--


LV:


--





Recommendations


CABG





Specimens


None





Radiation Exposure (mGy)


--





Contrast (mls)


--





Fluids (cc crystalloids)


--





Procedural Complication(s)


None





ACC Data


Cardiac Status


Clinical evaluation leading to the procedure


CAD Presntation:  STEMI





Closure Device


PCI Indication:  Immediate PCI for STEMI


First Noted:  First EKG

## 2023-02-09 NOTE — DIAGNOSTIC IMAGING REPORT
CHEST ONE VIEW PORTABLE



HISTORY:  47 years-old Male chest pain acute atypical chest pain



COMPARISON: Chest radiograph 7/5/2017, CTA chest 11/21/2017



TECHNIQUE: Portable AP view of the chest



FINDINGS: 

Cardiomediastinal and hilar silhouettes are within normal limits.

Atherosclerosis of the aorta. No pneumothorax, pleural effusion, focal airspace

consolidation or overt pulmonary edema. Bones of the chest appear grossly

intact. Curvilinear calcification overlying the right greater tuberosity may

reflect calcific bursitis, however is nonspecific. Surgical clips project over

the epigastric region.



IMPRESSION: No acute process. 







The above report was generated using voice recognition software. It may contain

grammatical, syntax or spelling errors.







Electronically signed by:  Fede Phelps M.D.

2/6/2018 6:40 PM



Dictated Date/Time:  2/6/2018 6:37 PM Const: Awake, alert and oriented. In no acute distress. Well appearing.  HEENT: NC/AT. Moist mucous membranes. No oral lesions noted   Eyes: No scleral icterus. EOMI.  Neck:. Soft and supple. Full ROM without pain.  Cardiac:  +S1/S2. No murmurs. Peripheral pulses 2+ and symmetric. No LE edema.  Resp: Speaking in full sentences. No evidence of respiratory distress. No wheezes, rales or rhonchi.  Abd: Soft, non-tender, non-distended. Normal bowel sounds in all 4 quadrants. No guarding or rebound.  Back: Spine midline and non-tender. No CVAT.  Skin: chest back rash resemblance contact dermatitis, left arm circular ring like two areas of fungal appearance   Lymph: No cervical lymphadenopathy.  Neuro: Awake, alert & oriented x 3. Moves all extremities symmetrically.

## 2024-11-07 NOTE — ANESTHESIOLOGY PROGRESS NOTE
Anesthesia Progress Note


Date of Service


Feb 6, 2018.





Progress Notes


I was called by a member of the cath lab team who stated they had a patient who 

was a heart alert that was becoming unstable and going into respiratory 

failure. I took the glidescope and anesthesia airway bag into the cath lab to 

find Mr. [place] undergoing emergent heart cath. He was conversant but had 

vomited and was complaining of chest pain. SpO2 was in low 90's. Shortly after 

I arrived, patient went into a malignant arrhythmia and was shocked x1 by Dr. Julien, attending interventional cardiologist. Patient again was conversant but 

as I was preparing to secure his airway became unresponsive and a code blue was 

called. Chest compressions were started and I maintained the airway with BVM 

with supplemental oxygen. After several rounds of compressions, patient 

regained consciousness and BP, HR and rhythm improved. Request was made to 

intubate and I administered 10mg etomidate followed by 100mg succinylcholine IV 

with cricoid pressure being held. DLx1 with glidescope #3 and 8.0 cuffed ETT 

was easily inserted. + ETCO2 and + b/l BS were auscultated. Respiratory therapy 

was present and secured the ETT and connected the patient to the ventilator. 

ICU attending was also present and he gave orders for vent settings. After 

airway was secured, cardiologist and ICU attending both stated they did not 

need any further from anesthesia. No